# Patient Record
Sex: FEMALE | Race: OTHER
[De-identification: names, ages, dates, MRNs, and addresses within clinical notes are randomized per-mention and may not be internally consistent; named-entity substitution may affect disease eponyms.]

---

## 2019-11-21 ENCOUNTER — HOSPITAL ENCOUNTER (EMERGENCY)
Dept: HOSPITAL 54 - ER | Age: 79
Discharge: HOME | End: 2019-11-21
Payer: MEDICARE

## 2019-11-21 VITALS — HEIGHT: 68 IN | BODY MASS INDEX: 31.83 KG/M2 | WEIGHT: 210 LBS

## 2019-11-21 VITALS — SYSTOLIC BLOOD PRESSURE: 104 MMHG | DIASTOLIC BLOOD PRESSURE: 59 MMHG

## 2019-11-21 DIAGNOSIS — Z88.1: ICD-10-CM
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
## 2019-11-21 DIAGNOSIS — Z88.0: ICD-10-CM
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
## 2019-11-21 DIAGNOSIS — Z98.890: ICD-10-CM
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
## 2019-11-21 DIAGNOSIS — S22.31XA: Primary | ICD-10-CM

## 2019-11-21 NOTE — NUR
Patient discharged to home with daughter in stable condition. Written and 
verbal after care instructions given. Patient verbalizes understanding of 
instruction.

## 2019-11-21 NOTE — NUR
REPORT RECEIVED FROM SASCHA HUYNH FOR JYOTSNA. PATIENT IN BED, ASLEEP, EASILY 
AROUSABLE BY VOICE. HOOKED TO MONITOR. VSS. WILL CONTINUE TO MONITOR. AWAITING 
FOR  BY DAUGHTER.

## 2019-11-21 NOTE — NUR
PT BIB RA TO ER C/O RIGHT SIDE PAIN NEAR THE SPINE 10/10 FROM FALLING AFTER 
LOSING BALANCE. PT DENIES LOSING CONSCIOUSNESS PRIOR TO FALL. PT STATES THAT 
SHE DID NOT HIT HER HEAD. AAOX4. NO SOB. NOT IN ANY DISTRESS. BREATHING EVENLY 
AND UNLABORED. CONNECTED TO MONITOR.